# Patient Record
(demographics unavailable — no encounter records)

---

## 2025-05-22 NOTE — HISTORY OF PRESENT ILLNESS
[FreeTextEntry1] : 10yo RHD M presents for evaluation of R finger pain sustained 5/17/25 while playing basketball with bigger kids when another kid blocked him and hit his finger. He had immediate pain after and presented to urgent care 5/18/25 where he was placed in an aluminum splint which he has been wearing. He reports pain is improved today. Denies numbness/tingling.  The patient's HPI was reviewed thoroughly with patient and parent. The patient's parent has acted as an independent historian regarding the above information due to the unreliable nature of the history obtained from the patient.  non-tender

## 2025-05-22 NOTE — PHYSICAL EXAM
[FreeTextEntry1] : Gait: Good coordination and balance noted. GENERAL: alert, cooperative, in NAD SKIN: The skin is intact, warm, pink and dry over the area examined. EYES: Normal conjunctiva, normal eyelids and pupils were equal and round. ENT: normal ears, normal nose and normal lips. CARDIOVASCULAR: brisk capillary refill, but no peripheral edema. RESPIRATORY: The patient is in no apparent respiratory distress. They're taking full deep breaths without use of accessory muscles or evidence of audible wheezes or stridor without the use of a stethoscope. Normal respiratory effort. ABDOMEN: not examined MSK: Focused exam RUE: +TTP over middle phalanx R index finger skin intact SILT radial and ulnar aspects of index finger Able to flex and extend finger with motion limited by pain somewhat +AIN PIN ulnar n CR<2s  No ecchymosis

## 2025-05-22 NOTE — DATA REVIEWED
[de-identified] : XR R index finger reviewed from 5/18/25: non-displaced middle phalanx index finger vertical fracture noted on AP. Skeletally immature.

## 2025-05-22 NOTE — REASON FOR VISIT
[Initial Evaluation] : an initial evaluation [Patient] : patient [Father] : father [FreeTextEntry1] : fractured finger rt hand

## 2025-05-22 NOTE — ASSESSMENT
[FreeTextEntry1] : 12yo RHD M presents with R index middle phalanx non-displaced vertical fracture, to be managed conservatively - had a long discussion with patient and family about diagnosis, natural history and treatment options - henry tape and radial gutter splint placed in clinic today without complication; pt tolerated this well - splint care instructions reviewed - NSAIDs and ice prn pain; elevate extremity above heart whenever possible - NWB with RUE - f/u in 3.5 weeks with xrays of R index finger out of brace - no sports/gym/running/jumping; note provided for school; we discussed he can likely return to sports by july - all questions answered - parent/patient in agreement with plan

## 2025-07-17 NOTE — REVIEW OF SYSTEMS
[Nl] : Musculoskeletal [Fever Above 102] : no fever [Itching] : no itching [Redness] : no redness [Sore Throat] : no sore throat [Wheezing] : no wheezing [Vomiting] : no vomiting [Seizure] : no seizures [Hyperactive] : no hyperactive behavior [Cold Intolerance] : cold tolerant

## 2025-07-17 NOTE — PHYSICAL EXAM
[FreeTextEntry1] : Gait: Good coordination and balance noted. GENERAL: alert, cooperative, in NAD SKIN: The skin is intact, warm, pink and dry over the area examined. EYES: Normal conjunctiva, normal eyelids and pupils were equal and round. ENT: normal ears, normal nose and normal lips. CARDIOVASCULAR: brisk capillary refill, but no peripheral edema. RESPIRATORY: The patient is in no apparent respiratory distress. They're taking full deep breaths without use of accessory muscles or evidence of audible wheezes or stridor without the use of a stethoscope. Normal respiratory effort. ABDOMEN: not examined  MSK: Focused exam RUE: No tenderness over middle phalanx R index finger skin intact SILT radial and ulnar aspects of index finger Able to flex and extend finger without any limitation  +AIN PIN ulnar n CR<2s  No ecchymosis

## 2025-07-17 NOTE — ASSESSMENT
[FreeTextEntry1] : 12yo RHD M presents with R index middle phalanx non-displaced vertical fracture, to be managed conservatively - had a long discussion with patient and family about diagnosis, natural history and treatment options - He is doing well overall.  - At this time, he can gradually return to activities as tolerated - He will f/u on prn basis All questions answered. Family and patient verbalize understanding of the plan.   IEsther PA-C have acted as scribe and documented the above for Dr. Murrieta

## 2025-07-17 NOTE — END OF VISIT
[FreeTextEntry3] :     Saw and examined patient; the above is an accurate documentation of my words and actions.   Paula Murrieta MD [facility] Pediatric Orthopedic Surgery

## 2025-07-17 NOTE — HISTORY OF PRESENT ILLNESS
[FreeTextEntry1] : Sukhdev is an 10yo RHD M presents for follow up of R finger pain sustained 5/17/25 while playing basketball with bigger kids when another kid blocked him and hit his finger. He had immediate pain after and presented to urgent care 5/18/25 where he was placed in an aluminum splint which he has been wearing. At initial visit on 5/22/25, he was transitioned to radial gutter brace. The brace was discontinued on 6/16/25 and transition to henry tape.   Today, he is doing well without any pain or discomfort. Denies any need for pain medication at home. Denies numbness/tingling. Here for further orthopedic evaluation and management.   The patient's HPI was reviewed thoroughly with patient and parent. The patient's parent has acted as an independent historian regarding the above information due to the unreliable nature of the history obtained from the patient. 
1 pair

## 2025-07-17 NOTE — DATA REVIEWED
[de-identified] : XR R index finger 3 views performed today 7/17/25:  non-displaced middle phalanx index finger vertical fracture noted on AP. Interval healing and periosteal reaction. Skeletally immature.